# Patient Record
Sex: MALE | Race: WHITE | Employment: FULL TIME | URBAN - NONMETROPOLITAN AREA
[De-identification: names, ages, dates, MRNs, and addresses within clinical notes are randomized per-mention and may not be internally consistent; named-entity substitution may affect disease eponyms.]

---

## 2021-02-25 ENCOUNTER — OFFICE VISIT (OUTPATIENT)
Dept: FAMILY MEDICINE CLINIC | Age: 21
End: 2021-02-25

## 2021-02-25 VITALS
TEMPERATURE: 99.3 F | HEART RATE: 74 BPM | OXYGEN SATURATION: 98 % | SYSTOLIC BLOOD PRESSURE: 134 MMHG | WEIGHT: 178.6 LBS | HEIGHT: 71 IN | DIASTOLIC BLOOD PRESSURE: 82 MMHG | BODY MASS INDEX: 25 KG/M2 | RESPIRATION RATE: 16 BRPM

## 2021-02-25 DIAGNOSIS — R41.3 MEMORY CHANGE: ICD-10-CM

## 2021-02-25 DIAGNOSIS — R07.89 CHEST TIGHTNESS: ICD-10-CM

## 2021-02-25 DIAGNOSIS — R07.9 CHEST PAIN, UNSPECIFIED TYPE: Primary | ICD-10-CM

## 2021-02-25 DIAGNOSIS — R11.10 VOMITING AND DIARRHEA: ICD-10-CM

## 2021-02-25 DIAGNOSIS — R19.7 VOMITING AND DIARRHEA: ICD-10-CM

## 2021-02-25 DIAGNOSIS — R05.9 COUGH: ICD-10-CM

## 2021-02-25 PROCEDURE — 99205 OFFICE O/P NEW HI 60 MIN: CPT | Performed by: FAMILY MEDICINE

## 2021-02-25 PROCEDURE — 36415 COLL VENOUS BLD VENIPUNCTURE: CPT | Performed by: FAMILY MEDICINE

## 2021-02-25 PROCEDURE — 93000 ELECTROCARDIOGRAM COMPLETE: CPT | Performed by: FAMILY MEDICINE

## 2021-02-25 SDOH — ECONOMIC STABILITY: INCOME INSECURITY: HOW HARD IS IT FOR YOU TO PAY FOR THE VERY BASICS LIKE FOOD, HOUSING, MEDICAL CARE, AND HEATING?: NOT HARD AT ALL

## 2021-02-25 ASSESSMENT — ENCOUNTER SYMPTOMS
COUGH: 1
VOMITING: 1
DIARRHEA: 1
BLOOD IN STOOL: 0
TROUBLE SWALLOWING: 0
EYE PAIN: 0
CONSTIPATION: 0
SHORTNESS OF BREATH: 0
ABDOMINAL PAIN: 0
NAUSEA: 0

## 2021-02-25 NOTE — PROGRESS NOTES
[address] [address]  Dept: [phone]  Dept Fax: [phone]  Loc: [facility] Primer is a 23 y.o. male who presents todayfor Chest Pain (chest tightness ongoing since 2:30 yesterday, ) and Diarrhea      :   HPI  Chest pain and tightness started yesterday. Was moving a part that was around 5 lbs when he first noticed the pain. States that the pain is located on the left side of his chest and radiates to his back. Had a heat stress injury, that felt similar to this in the past. 5/10 today. Has not been worsening or getting any better. Had baby aspirin last night after calling ambulance. Had some tylenol at home. Neither helped to much with his pain. Hurts more with big deep breath. Feels like he has a 50lb weight on his chest. Used to smoke cigarettes, currently vaping nicotine. Denies alcohol use. Blood pressure was 157/80 last night while in the ambulance. Also complaining of shortness of breath last night. Had numbness of his hands, face, and jaw. Diarrhea and vomiting starting on Monday. Brother sick as well. 15years old and has fifths diease. patient has No Known Allergies. Past MedicalHistory  Trace  has no past medical history on file. Past Surgical History  The patient  has no past surgical history on file. Family History  This patient's family history is not on file. Social History  Trace  reports that he quit smoking about 18 months ago. He has a 6.00 pack-year smoking history. He quit smokeless tobacco use about 2 years ago. Medications  No current outpatient medications on file.    :     Review of Systems   Constitutional: Negative for chills, fatigue and fever. HENT: Negative for ear pain, postnasal drip and trouble swallowing. Eyes: Negative for pain and visual disturbance. Respiratory: Positive for cough. Negative for shortness of breath.     Cardiovascular: Positive for chest pain. Negative for palpitations. Gastrointestinal: Positive for diarrhea and vomiting. Negative for abdominal pain, blood in stool, constipation and nausea. Genitourinary: Negative for dysuria and urgency. Skin: Negative for rash and wound. Neurological: Negative for dizziness and headaches. Psychiatric/Behavioral: Negative for dysphoric mood. The patient is not nervous/anxious. :     Vitals:    02/25/21 1247 02/25/21 1332   BP: 134/82    Site: Left Upper Arm    Position: Sitting    Cuff Size: Large Adult    Pulse: 95 74   Resp: 16    Temp: 99.3 °F (37.4 °C)    TempSrc: Temporal    SpO2: 98%    Weight: 178 lb 9.6 oz (81 kg)    Height: 5' 11\" (1.803 m)        Physical Exam  Vitals signs and nursing note reviewed. Constitutional:       General: He is not in acute distress. Appearance: He is well-developed. He is not diaphoretic. HENT:      Head: Normocephalic and atraumatic. Right Ear: External ear normal.      Left Ear: External ear normal.      Nose: Nose normal.   Eyes:      General: No scleral icterus. Right eye: No discharge. Left eye: No discharge. Conjunctiva/sclera: Conjunctivae normal.   Neck:      Musculoskeletal: Normal range of motion. Cardiovascular:      Rate and Rhythm: Normal rate and regular rhythm. Heart sounds: Normal heart sounds. No murmur. Pulmonary:      Effort: Pulmonary effort is normal.      Breath sounds: Normal breath sounds. Skin:     General: Skin is warm and dry. Findings: No erythema or rash. Neurological:      Mental Status: He is alert and oriented to person, place, and time. Cranial Nerves: No cranial nerve deficit. Psychiatric:         Attention and Perception: Attention and perception normal.         Mood and Affect: Affect normal. Mood is anxious. Speech: Speech normal.         Behavior: Behavior normal.         Thought Content:  Thought content normal.         Cognition and Memory: Cognition normal.         Judgment: Judgment normal.         Assessment/Plan:   1. Chest pain, unspecified type  -Unclear etiology however suspect this is musculoskeletal type chest pain. Pain may be secondary to his line of work versus costochondral strain due to his cough or vomiting. -EKG reassuring   -Checks x-ray normal.  -We will also check CBC, TSH, BMP for further evaluation  -Follow-up in 2 weeks or if chest pain is not improving. -COVID-19 test as well today  - EKG 12 lead; Future  - EKG 12 lead  - XR CHEST STANDARD (2 VW); Future  - XR CHEST STANDARD (2 VW)  - CBC With Auto Differential; Future  - TSH with Reflex; Future  - Basic Metabolic Panel; Future    2. Chest tightness  Plan as above  - EKG 12 lead; Future  - EKG 12 lead  - XR CHEST STANDARD (2 VW); Future  - XR CHEST STANDARD (2 VW)  - CBC With Auto Differential; Future  - TSH with Reflex; Future  - Basic Metabolic Panel; Future    3. Cough  -Sounds more chronic however due to viral illness in the home we will swab for COVID-19 today. - COVID-19 Ambulatory; Future  - XR CHEST STANDARD (2 VW); Future  - XR CHEST STANDARD (2 VW)  - Basic Metabolic Panel; Future  - COVID-19 Ambulatory    4. Vomiting and diarrhea  -Unclear etiology however suspect viral in nature. -Brother at home with viral illness as well. -We will follow-up in 2 weeks or if symptoms do not improve. 5. Memory change  Some chronic forgetfulness; checking labs. No acute changes today. - Vitamin B12 & Folate; Future  - RPR; Future  - RPR  - Vitamin B12 & Folate        Return in about 2 weeks (around 3/11/2021) for follow up chest pain.     Orders Placed  Orders Placed This Encounter   Procedures    XR CHEST STANDARD (2 VW)     Standing Status:   Future     Number of Occurrences:   1     Standing Expiration Date:   2/25/2022     Order Specific Question:   Reason for exam:     Answer:   chest pain new onset with cough    COVID-19 Ambulatory     Standing Status: Future     Number of Occurrences:   1     Standing Expiration Date:   2/25/2022     Scheduling Instructions:      Saline media preferred given current shortage of viral transport media but both acceptable     Order Specific Question:   Is this test for diagnosis or screening? Answer:   Diagnosis of ill patient     Order Specific Question:   Symptomatic for COVID-19 as defined by CDC? Answer:   Yes     Order Specific Question:   Date of Symptom Onset     Answer:   2/22/2021     Order Specific Question:   Hospitalized for COVID-19? Answer:   No     Order Specific Question:   Admitted to ICU for COVID-19? Answer:   No     Order Specific Question:   Employed in healthcare setting? Answer:   No     Order Specific Question:   Resident in a congregate (group) care setting? Answer:   No     Order Specific Question:   Pregnant: Answer:   No     Order Specific Question:   Previously tested for COVID-19? Answer:   Unknown    CBC With Auto Differential     Standing Status:   Future     Standing Expiration Date:   2/25/2022    TSH with Reflex     Standing Status:   Future     Standing Expiration Date:   2/25/2022    Basic Metabolic Panel     Standing Status:   Future     Standing Expiration Date:   2/25/2022    EKG 12 lead     Standing Status:   Future     Number of Occurrences:   1     Standing Expiration Date:   4/26/2021     Order Specific Question:   Reason for Exam?     Answer:   Chest pain       Prescriptions given/sent   No orders of the defined types were placed in this encounter. Patient instructions given and reviewed. Discussed use, benefit, and side effects of prescribed medications. All patient questions answered. Pt voiced understanding. Attending attestation:  I personally performed and participated key or critical portions of the evaluation and management including personally performing the exam and medical decision making.   I verify the accuracy of the documentation by the resident with the following addition or changes: Chest pain is reproducible with palpation and most consistent with costochondral sprain. Normal EKG and CXR. Will also check COVID-19 given constellation of symptoms. Off work until COVID-19 test back. Exam and vitals benign with no indication of serious underlying process. Encouraged supportive care with rest, hydration, honey for cough. Tylenol and ibuprofen as needed. Will also check lab labs. Does admit to significant anxiety. Reports that he was in the [employer] and ended up admitted for SI/HI several years ago and was on Wellbutrin and two different SSRIs for a month each without symptom relief prior to ending up with discharge on disability. Also mentions an episode of hyperthermia related to [address] where core temperature reached 106 degrees. Some ongoing memory issues for several years also; unclear if prior neuroimaging. Denies drug use in the past apart from occasional marijuana - not currently using. Denies current alcohol use; quit about a year ago. Was drinking quite heavily for some time prior. Is  at present without children. Does hope to return to college and is interested in training to be a dental hygienist.   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 Does have counseling appointment upcoming tomorrow with the [place] and may pursue additional work-up for anxiety versus ADHD. No clear depressive component. Certainly anxiety could contribute to many of patient's symptoms including numbness and tingling in hands and diarrhea. Will re-check in 2 weeks and attempt to determine once labs back if any organic cause and if counseling alone effective or if medication management may be a reasonable adjunct.         Electronically signed by Chandu Min MD on 2/25/2021 at 3:17 PM        On this date 02/25/21 I have spent 65 minutes reviewing previous notes, test results and face to face with the patient discussing the diagnosis and importance of compliance with the treatment plan as well as documenting on the day of the visit.     Electronically signed by Lakshmi Gomez DO on 2/25/2021at 1:47 PM

## 2021-02-26 LAB
ANION GAP SERPL CALCULATED.3IONS-SCNC: 11 MEQ/L (ref 8–16)
BASOPHILS # BLD: 0.9 %
BASOPHILS ABSOLUTE: 0.1 THOU/MM3 (ref 0–0.1)
BUN BLDV-MCNC: 12 MG/DL (ref 7–22)
CALCIUM SERPL-MCNC: 9.8 MG/DL (ref 8.5–10.5)
CHLORIDE BLD-SCNC: 104 MEQ/L (ref 98–111)
CO2: 26 MEQ/L (ref 23–33)
CREAT SERPL-MCNC: 0.9 MG/DL (ref 0.4–1.2)
EOSINOPHIL # BLD: 1.8 %
EOSINOPHILS ABSOLUTE: 0.1 THOU/MM3 (ref 0–0.4)
ERYTHROCYTE [DISTWIDTH] IN BLOOD BY AUTOMATED COUNT: 11.8 % (ref 11.5–14.5)
ERYTHROCYTE [DISTWIDTH] IN BLOOD BY AUTOMATED COUNT: 39.2 FL (ref 35–45)
FOLATE: 16 NG/ML (ref 4.8–24.2)
GFR SERPL CREATININE-BSD FRML MDRD: > 90 ML/MIN/1.73M2
GLUCOSE BLD-MCNC: 91 MG/DL (ref 70–108)
HCT VFR BLD CALC: 50.2 % (ref 42–52)
HEMOGLOBIN: 16.8 GM/DL (ref 14–18)
IMMATURE GRANS (ABS): 0.04 THOU/MM3 (ref 0–0.07)
IMMATURE GRANULOCYTES: 0.7 %
LYMPHOCYTES # BLD: 26.1 %
LYMPHOCYTES ABSOLUTE: 1.5 THOU/MM3 (ref 1–4.8)
MCH RBC QN AUTO: 30.4 PG (ref 26–33)
MCHC RBC AUTO-ENTMCNC: 33.5 GM/DL (ref 32.2–35.5)
MCV RBC AUTO: 90.8 FL (ref 80–94)
MONOCYTES # BLD: 10.9 %
MONOCYTES ABSOLUTE: 0.6 THOU/MM3 (ref 0.4–1.3)
NUCLEATED RED BLOOD CELLS: 0 /100 WBC
PLATELET # BLD: 298 THOU/MM3 (ref 130–400)
PMV BLD AUTO: 10.1 FL (ref 9.4–12.4)
POTASSIUM SERPL-SCNC: 4.3 MEQ/L (ref 3.5–5.2)
RBC # BLD: 5.53 MILL/MM3 (ref 4.7–6.1)
RPR: NONREACTIVE
SEG NEUTROPHILS: 59.6 %
SEGMENTED NEUTROPHILS ABSOLUTE COUNT: 3.4 THOU/MM3 (ref 1.8–7.7)
SODIUM BLD-SCNC: 141 MEQ/L (ref 135–145)
TSH SERPL DL<=0.05 MIU/L-ACNC: 1.06 UIU/ML (ref 0.4–4.2)
VITAMIN B-12: 963 PG/ML (ref 211–911)
WBC # BLD: 5.7 THOU/MM3 (ref 4.8–10.8)

## 2021-02-27 LAB — SARS-COV-2: NOT DETECTED

## 2021-03-01 ENCOUNTER — TELEPHONE (OUTPATIENT)
Dept: FAMILY MEDICINE CLINIC | Age: 21
End: 2021-03-01

## 2021-12-07 ENCOUNTER — HOSPITAL ENCOUNTER (EMERGENCY)
Age: 21
Discharge: HOME | End: 2021-12-07
Payer: OTHER GOVERNMENT

## 2021-12-07 VITALS
HEART RATE: 85 BPM | OXYGEN SATURATION: 99 % | BODY MASS INDEX: 25.2 KG/M2 | WEIGHT: 180 LBS | HEIGHT: 71 IN | RESPIRATION RATE: 14 BRPM | DIASTOLIC BLOOD PRESSURE: 78 MMHG | TEMPERATURE: 98.3 F | SYSTOLIC BLOOD PRESSURE: 129 MMHG

## 2021-12-07 DIAGNOSIS — J06.9 UPPER RESPIRATORY TRACT INFECTION DUE TO COVID-19 VIRUS: Primary | ICD-10-CM

## 2021-12-07 DIAGNOSIS — U07.1 UPPER RESPIRATORY TRACT INFECTION DUE TO COVID-19 VIRUS: Primary | ICD-10-CM

## 2021-12-07 LAB — SARS-COV-2, NAA: DETECTED

## 2021-12-07 PROCEDURE — 99202 OFFICE O/P NEW SF 15 MIN: CPT | Performed by: NURSE PRACTITIONER

## 2021-12-07 PROCEDURE — 99203 OFFICE O/P NEW LOW 30 MIN: CPT

## 2021-12-07 PROCEDURE — 87635 SARS-COV-2 COVID-19 AMP PRB: CPT

## 2021-12-07 RX ORDER — ASCORBIC ACID 500 MG
500 TABLET ORAL 2 TIMES DAILY
Qty: 14 TABLET | Refills: 0 | COMMUNITY
Start: 2021-12-07 | End: 2021-12-14

## 2021-12-07 RX ORDER — IBUPROFEN 400 MG/1
400 TABLET ORAL EVERY 6 HOURS PRN
COMMUNITY
End: 2021-12-07

## 2021-12-07 RX ORDER — ZINC SULFATE 50(220)MG
50 CAPSULE ORAL DAILY
Qty: 7 CAPSULE | Refills: 0 | COMMUNITY
Start: 2021-12-07 | End: 2021-12-14

## 2021-12-07 RX ORDER — ONDANSETRON 4 MG/1
4 TABLET, ORALLY DISINTEGRATING ORAL EVERY 8 HOURS PRN
Qty: 15 TABLET | Refills: 0 | Status: SHIPPED | OUTPATIENT
Start: 2021-12-07

## 2021-12-07 RX ORDER — DEXAMETHASONE 6 MG/1
6 TABLET ORAL 2 TIMES DAILY WITH MEALS
Qty: 20 TABLET | Refills: 0 | Status: SHIPPED | OUTPATIENT
Start: 2021-12-07 | End: 2021-12-17

## 2021-12-07 RX ORDER — ACETAMINOPHEN 500 MG
500 TABLET ORAL EVERY 4 HOURS PRN
Qty: 20 TABLET | Refills: 0 | COMMUNITY
Start: 2021-12-07 | End: 2021-12-17

## 2021-12-07 ASSESSMENT — ENCOUNTER SYMPTOMS
DIARRHEA: 1
VOMITING: 0
CHEST TIGHTNESS: 0
STRIDOR: 0
APNEA: 0
RHINORRHEA: 1
ABDOMINAL PAIN: 0
SHORTNESS OF BREATH: 0
SINUS PRESSURE: 1
WHEEZING: 0
CHOKING: 0
SORE THROAT: 0
FLU SYMPTOMS: 1
NAUSEA: 1
COUGH: 1

## 2021-12-07 ASSESSMENT — PAIN DESCRIPTION - LOCATION: LOCATION: CHEST

## 2021-12-07 ASSESSMENT — PAIN DESCRIPTION - FREQUENCY: FREQUENCY: CONTINUOUS

## 2021-12-07 ASSESSMENT — PAIN DESCRIPTION - PAIN TYPE: TYPE: ACUTE PAIN

## 2021-12-07 ASSESSMENT — PAIN DESCRIPTION - DESCRIPTORS: DESCRIPTORS: PRESSURE

## 2021-12-07 ASSESSMENT — PAIN SCALES - GENERAL: PAINLEVEL_OUTOF10: 4

## 2021-12-07 NOTE — ED NOTES
Pt verbalized discharge instructions. Pt informed to go to ER if develop chest pain, shortness of breath or abdominal pain. Pt ambulatory out in stable condition. Assessment unchanged.        Shirley Lam RN  12/07/21 5154

## 2021-12-07 NOTE — ED TRIAGE NOTES
Pt ambulatory into esuc with c/o for covid. Pt states he has cough and shortness of breath with exertion for the past six days. Pt states chest heaviness with pain of 4.

## 2021-12-07 NOTE — ED PROVIDER NOTES
[facility]  Urgent Care Encounter      CHIEF COMPLAINT       Chief Complaint   Patient presents with    Concern For COVID-19     cough sob with exertion        Nurses Notes reviewed and I agree except as noted in the HPI. Diomedes Hendrix is a 19 y.o. The history is provided by the patient. No  was used. Influenza  Presenting symptoms: cough, diarrhea, fatigue, headache, myalgias, nausea and rhinorrhea    Presenting symptoms: no fever, no shortness of breath, no sore throat and no vomiting    Severity:  Moderate  Onset quality:  Sudden  Progression:  Worsening  Relieved by:  Nothing  Worsened by:  Nothing  Ineffective treatments:  None tried  Associated symptoms: decreased appetite, decreased physical activity and nasal congestion    Associated symptoms: no chills, no ear pain, no mental status change, no neck stiffness and no syncope    Risk factors: sick contacts    Risk factors: not elderly, no diabetes problem, no heart disease, no immunocompromised state, no kidney disease, no liver disease and not pregnant        REVIEW OF SYSTEMS     Review of Systems   Constitutional: Positive for activity change, appetite change, decreased appetite, diaphoresis and fatigue. Negative for chills and fever. HENT: Positive for congestion, postnasal drip, rhinorrhea and sinus pressure. Negative for ear pain and sore throat. Respiratory: Positive for cough. Negative for apnea, choking, chest tightness, shortness of breath, wheezing and stridor. Cardiovascular: Negative for chest pain, palpitations and leg swelling. Gastrointestinal: Positive for diarrhea and nausea. Negative for abdominal pain and vomiting. Musculoskeletal: Positive for myalgias. Negative for neck stiffness. Neurological: Positive for headaches. Negative for dizziness and light-headedness.        PAST MEDICAL HISTORY         Diagnosis Date    Anxiety     Depression SURGICAL HISTORY     Patient  has no past surgical history on file. CURRENT MEDICATIONS       Discharge Medication List as of 12/7/2021 12:30 PM      CONTINUE these medications which have NOT CHANGED    Details   dextromethorphan-guaiFENesin (MUCINEX DM)  MG per extended release tablet Take 1 tablet by mouth every 12 hours as neededHistorical Med             ALLERGIES     Patient is has No Known Allergies. FAMILY HISTORY     Patient's family history is not on file. SOCIAL HISTORY     Patient  reports that he quit smoking about 2 years ago. He has a 6.00 pack-year smoking history. His smokeless tobacco use includes chew. He reports previous alcohol use. He reports current drug use. Drug: Marijuana Lucianne Bars). PHYSICAL EXAM     ED TRIAGE VITALS  BP: 129/78, Temp: 98.3 °F (36.8 °C), Pulse: 85, Resp: 14, SpO2: 99 %  Physical Exam  Vitals and nursing note reviewed. Constitutional:       General: He is not in acute distress. Appearance: Normal appearance. He is normal weight. He is not ill-appearing, toxic-appearing or diaphoretic. HENT:      Head: Normocephalic and atraumatic. Right Ear: Tympanic membrane, ear canal and external ear normal. There is no impacted cerumen. Left Ear: Tympanic membrane, ear canal and external ear normal. There is no impacted cerumen. Nose: Rhinorrhea present. No congestion. Mouth/Throat:      Mouth: Mucous membranes are moist.      Pharynx: Posterior oropharyngeal erythema present. No oropharyngeal exudate. Eyes:      Extraocular Movements: Extraocular movements intact. Conjunctiva/sclera: Conjunctivae normal.   Pulmonary:      Effort: Pulmonary effort is normal. No respiratory distress. Breath sounds: Normal breath sounds. No stridor. No wheezing, rhonchi or rales. Chest:      Chest wall: No tenderness. Musculoskeletal:         General: Normal range of motion. Cervical back: Normal range of motion.    Skin:     General: Skin is warm. Neurological:      General: No focal deficit present. Mental Status: He is alert and oriented to person, place, and time. Psychiatric:         Mood and Affect: Mood normal.         Behavior: Behavior normal.         Thought Content: Thought content normal.         Judgment: Judgment normal.         DIAGNOSTIC RESULTS   Labs:  Results for orders placed or performed during the hospital encounter of 12/07/21   COVID-19, Rapid   Result Value Ref Range    SARS-CoV-2, MAYANK DETECTED (AA) NOT DETECTED       IMAGING:  No orders to display     URGENT CARE COURSE:     Vitals:    12/07/21 1157   BP: 129/78   Pulse: 85   Resp: 14   Temp: 98.3 °F (36.8 °C)   TempSrc: Temporal   SpO2: 99%   Weight: 180 lb (81.6 kg)   Height: 5' 11\" (1.803 m)       Medications - No data to display  PROCEDURES:  None  FINAL IMPRESSION      1. Upper respiratory tract infection due to COVID-19 virus        DISPOSITION/PLAN   Decision To Discharge    You are COVID-19 positive. You will be contacted by the [address] [place] [address] and/or the [facility] Department regarding length of quarantine when you may return to work and/or school. When to seek immediate emergency medical attention:    Uncontrollable fever  Trouble breathing  Persistent pain or pressure in the chest  New confusion  Inability to wake or stay awake  Pale, gray, or blue-colored skin, lips, or nail beds, depending on skin tone  *This list is not all possible symptoms. Please call your medical provider for any other symptoms that are severe or concerning to you. May take over-the-counter supplements daily if no contraindications:  Multi-vitamin/multi-mineral daily   Vitamin D3 4000 IU daily  Zinc 75 mg daily  Vitamin C 1000 mg twice daily  B-100 complex as daily as directed on bottle  Gargle with mouthwash 3 times daily, may use Scope, Crest, or Listerine.           PATIENT REFERRED TO:  [address] [place] today  [phone]    DISCHARGE MEDICATIONS:  Discharge Medication List as of 12/7/2021 12:30 PM      START taking these medications    Details   acetaminophen (TYLENOL) 500 MG tablet Take 1 tablet by mouth every 4 hours as needed for Pain or Fever, Disp-20 tablet, R-0OTC      ascorbic acid (VITAMIN C) 500 MG tablet Take 1 tablet by mouth 2 times daily for 7 days, Disp-14 tablet, R-0OTC      zinc sulfate (ZINCATE) 220 (50 Zn) MG capsule Take 1 capsule by mouth daily for 7 days, Disp-7 capsule, R-0OTC      dexamethasone (DECADRON) 6 MG tablet Take 1 tablet by mouth 2 times daily (with meals) for 10 days, Disp-20 tablet, R-0Normal      ondansetron (ZOFRAN ODT) 4 MG disintegrating tablet Take 1 tablet by mouth every 8 hours as needed for Nausea or Vomiting, Disp-15 tablet, R-0Normal           Discharge Medication List as of 12/7/2021 12:30 PM          MARY Ba CNP, APRN - CNP  12/07/21 1301

## 2021-12-07 NOTE — Clinical Note
Cesar Scales was seen and treated in our emergency department on 12/7/2021. He may return to work on 12/13/2021. Follow CDC guidelines requiring 10 days of quarantine which was reported as last Thursday. If you have any questions or concerns, please don't hesitate to call.       Carina Souza, APRN - CNP

## 2021-12-08 ENCOUNTER — CARE COORDINATION (OUTPATIENT)
Dept: CARE COORDINATION | Age: 21
End: 2021-12-08

## 2021-12-08 NOTE — CARE COORDINATION
Patient contacted regarding COVID-19 diagnosis. Discussed COVID-19 related testing which was available at this time. Test results were positive. Patient informed of results, if available? Yes. Ambulatory Care Manager contacted the patient by telephone to perform post discharge assessment. Call within 2 business days of discharge: Yes. Verified name and  with patient as identifiers. Provided introduction to self, and explanation of the CTN/ACM role, and reason for call due to risk factors for infection and/or exposure to COVID-19. Symptoms reviewed with patient who verbalized the following symptoms: fatigue, cough, shortness of breath and congestion. Due to no new or worsening symptoms encounter was not routed to provider for escalation. Discussed follow-up appointments. If no appointment was previously scheduled, appointment scheduling offered: Yes. declined  [facility] follow up appointment(s):   Future Appointments   Date Time Provider Tracie Murphy   2022  3:20 PM MARY Cardona - CNP [facility] [place] [facility] follow up appointment(s): ALEYDA    Non-face-to-face services provided:  Obtained and reviewed discharge summary and/or continuity of care documents     Advance Care Planning:   Does patient have an Advance Directive:  reviewed and current. Educated patient about risk for severe COVID-19 due to risk factors according to CDC guidelines. ACM reviewed discharge instructions, medical action plan and red flag symptoms with the patient who verbalized understanding. Discussed COVID vaccination status: Yes. Education provided on COVID-19 vaccination as appropriate. Discussed exposure protocols and quarantine with CDC Guidelines. Patient was given an opportunity to verbalize any questions and concerns and agrees to contact ACM or health care provider for questions related to their healthcare.     Reviewed and educated patient on any new and changed medications related to discharge diagnosis     Was patient discharged with a pulse oximeter? No Discussed and confirmed pulse oximeter discharge instructions and when to notify provider or seek emergency care. ACM provided contact information. Plan for follow-up call in 5-7 days based on severity of symptoms and risk factors. Advised on zone sheet, symptom management, reporting worsening symptoms, deep breathing exercises, staying active, and hydration.

## 2021-12-12 ENCOUNTER — APPOINTMENT (OUTPATIENT)
Dept: GENERAL RADIOLOGY | Age: 21
End: 2021-12-12
Payer: OTHER GOVERNMENT

## 2021-12-12 ENCOUNTER — HOSPITAL ENCOUNTER (EMERGENCY)
Age: 21
Discharge: HOME | End: 2021-12-12
Attending: EMERGENCY MEDICINE
Payer: OTHER GOVERNMENT

## 2021-12-12 VITALS
RESPIRATION RATE: 18 BRPM | OXYGEN SATURATION: 100 % | HEART RATE: 61 BPM | SYSTOLIC BLOOD PRESSURE: 128 MMHG | TEMPERATURE: 98.6 F | DIASTOLIC BLOOD PRESSURE: 81 MMHG

## 2021-12-12 DIAGNOSIS — U07.1 UPPER RESPIRATORY TRACT INFECTION DUE TO COVID-19 VIRUS: Primary | ICD-10-CM

## 2021-12-12 DIAGNOSIS — J06.9 UPPER RESPIRATORY TRACT INFECTION DUE TO COVID-19 VIRUS: Primary | ICD-10-CM

## 2021-12-12 PROCEDURE — 99282 EMERGENCY DEPT VISIT SF MDM: CPT

## 2021-12-12 PROCEDURE — 71046 X-RAY EXAM CHEST 2 VIEWS: CPT

## 2021-12-12 ASSESSMENT — ENCOUNTER SYMPTOMS
NAUSEA: 0
CHEST TIGHTNESS: 0
COUGH: 1
CONSTIPATION: 0
BACK PAIN: 0
DIARRHEA: 0
SINUS PRESSURE: 1
TROUBLE SWALLOWING: 0
SHORTNESS OF BREATH: 0
SORE THROAT: 1
ABDOMINAL PAIN: 0
VOMITING: 0
WHEEZING: 1
VOICE CHANGE: 0
RHINORRHEA: 1

## 2021-12-12 NOTE — ED PROVIDER NOTES
[facility]        Room # B/B    CHIEF COMPLAINT    Chief Complaint   Patient presents with    Positive For Covid-19    Wheezing    Cough       Nurses Notes reviewed and I agree except as noted in the HPI. HPI    Trace T Barrington Alexander is a 19 y.o. male who presents for evaluation of wheezing and coughing and fatigue since this morning. The patient started to have symptoms of wheezing fatigue low-grade fever muscle aches, diarrhea, nausea 12/2/2021 which persisted up to present. The patient went to urgent care and have a Covid test which turned out + 12/7/2021. Patient was sent home with dexamethasone which is still currently taking at this time Zofran. Patient's territory symptoms lingered and today the patient has had fatigue wheezing however she is still eating and drinking at the present. Patient denies any shortness of breath or chest pain denies any syncope no flank pain back pain. REVIEW OF SYSTEMS    Review of Systems   Constitutional: Positive for fatigue. Negative for appetite change, chills, diaphoresis and fever. HENT: Positive for congestion, rhinorrhea, sinus pressure and sore throat. Negative for ear discharge, ear pain, postnasal drip, trouble swallowing and voice change. Respiratory: Positive for cough and wheezing. Negative for chest tightness and shortness of breath. Cardiovascular: Negative for chest pain, palpitations and leg swelling. Gastrointestinal: Negative for abdominal pain, constipation, diarrhea, nausea and vomiting. Musculoskeletal: Positive for joint swelling and myalgias. Negative for arthralgias, back pain, neck pain and neck stiffness. Skin: Negative for rash. Neurological: Positive for weakness. Negative for dizziness, syncope, light-headedness, numbness and headaches. PAST MEDICAL HISTORY     has a past medical history of Anxiety and Depression.     SURGICAL HISTORY has no past surgical history on file. CURRENT MEDICATIONS    Discharge Medication List as of 12/12/2021  1:16 PM      CONTINUE these medications which have NOT CHANGED    Details   dextromethorphan-guaiFENesin (MUCINEX DM)  MG per extended release tablet Take 1 tablet by mouth every 12 hours as neededHistorical Med      acetaminophen (TYLENOL) 500 MG tablet Take 1 tablet by mouth every 4 hours as needed for Pain or Fever, Disp-20 tablet, R-0OTC      ascorbic acid (VITAMIN C) 500 MG tablet Take 1 tablet by mouth 2 times daily for 7 days, Disp-14 tablet, R-0OTC      zinc sulfate (ZINCATE) 220 (50 Zn) MG capsule Take 1 capsule by mouth daily for 7 days, Disp-7 capsule, R-0OTC      dexamethasone (DECADRON) 6 MG tablet Take 1 tablet by mouth 2 times daily (with meals) for 10 days, Disp-20 tablet, R-0Normal      ondansetron (ZOFRAN ODT) 4 MG disintegrating tablet Take 1 tablet by mouth every 8 hours as needed for Nausea or Vomiting, Disp-15 tablet, R-0Normal             ALLERGIES    has No Known Allergies. FAMILY HISTORY    has no family status information on file. family history is not on file. SOCIAL HISTORY     reports that he quit smoking about 2 years ago. He has a 6.00 pack-year smoking history. His smokeless tobacco use includes chew. He reports previous alcohol use. He reports current drug use. Drug: Marijuana Alejandro Xiao). PHYSICAL EXAM      INITIAL VITALS: /81   Pulse 61   Temp 98.6 °F (37 °C) (Oral)   Resp 18   SpO2 100% Estimated body mass index is 25.1 kg/m² as calculated from the following:    Height as of 12/7/21: 5' 11\" (1.803 m). Weight as of 12/7/21: 180 lb (81.6 kg). Physical Exam  Vitals reviewed. Constitutional:       Appearance: He is well-developed. HENT:      Head: Normocephalic and atraumatic. Right Ear: External ear normal.      Left Ear: External ear normal.      Nose: Nose normal.   Eyes:      General: No scleral icterus.      Conjunctiva/sclera: Conjunctivae normal.      Pupils: Pupils are equal, round, and reactive to light. Neck:      Thyroid: No thyromegaly. Vascular: No JVD. Cardiovascular:      Rate and Rhythm: Normal rate and regular rhythm. Heart sounds: No murmur heard. No friction rub. Pulmonary:      Effort: Pulmonary effort is normal.      Breath sounds: Decreased breath sounds present. No wheezing or rales. Chest:      Chest wall: No tenderness. Abdominal:      General: Bowel sounds are normal.      Palpations: Abdomen is soft. There is no mass. Tenderness: There is no abdominal tenderness. Musculoskeletal:      Cervical back: Normal range of motion and neck supple. Lymphadenopathy:      Cervical: No cervical adenopathy. Skin:     Findings: No rash. Neurological:      Mental Status: He is alert and oriented to person, place, and time. Psychiatric:         Behavior: Behavior is cooperative. MEDICAL DECISION MAKING    DIFFERENTIAL DIAGNOSIS:  Upper respiratory infection, fatigue, history of Covid, pneumonia      DIAGNOSTIC RESULTS      RADIOLOGY:  I have reviewed radiologic plain film image(s). The plain films will be read or overread by the radiologist.All other non-plain film images(s) such as CT, Ultrasound and MRI have been read by the radiologist.  XR CHEST (2 VW)   Final Result   Normal chest series. **This report has been created using voice recognition software. It may contain minor errors which are inherent in voice recognition technology. **      Final report electronically signed by Dr. Arnaldo Marcos MD on 12/12/2021 12:51 PM            Vitals:    Vitals:    12/12/21 1120   BP: 128/81   Pulse: 61   Resp: 18   Temp: 98.6 °F (37 °C)   TempSrc: Oral   SpO2: 100%       EMERGENCY DEPARTMENT COURSE:    Medications - No data to display    The pt was seen and evaluated by me. Within the department, I observed the pt's vitalsigns to be within acceptable range. Radiological studies were performed, results were reviewed with the patient. Within the department, the pt was given pulse oximetry apparatus. I observed the pt's condition to be hemodynamically stable during the duration of their stay. I explained my proposed course of treatment to the pt, and they were amenable to my decision. They were discharged home, and they will return to the ED if their symptoms become more severein nature, or otherwise change. Controlled Substances Monitoring:        CRITICAL CARE:   None. CONSULTS:  None    PROCEDURES:  None. FINAL IMPRESSION       1. Upper respiratory tract infection due to COVID-19 virus          DISPOSITION/PLAN  PATIENT REFERRED TO:  Family physician    Schedule an appointment as soon as possible for a visit on 12/16/2021      DISCHARGE MEDICATIONS:  Discharge Medication List as of 12/12/2021  1:16 PM            (Please note that portions of this note were completed with a voice recognition program and electronically transcribed. Efforts were [facility] edit the dictations but occasionally words are mis-transcribed . The transcription may contain errors not detected in proofreading.   This transcription was electronically signed.)     12/12/21 1:26 PM      Leonarda Loza MD      Emergency room physician           Leonarda Loza MD  12/12/21 3563

## 2021-12-12 NOTE — ED TRIAGE NOTES
Pt to er. Pt states dx COVID last week at [facility]. States has wheezing, worsening cough, abd pain and several other symptoms.

## 2021-12-12 NOTE — Clinical Note
Cesar Mcgrath was seen and treated in our emergency department on 12/12/2021. He may return to work on 12/20/2021. If you have any questions or concerns, please don't hesitate to call.       Eduin Lawrence MD

## 2021-12-13 ENCOUNTER — CARE COORDINATION (OUTPATIENT)
Dept: CARE COORDINATION | Age: 21
End: 2021-12-13

## 2021-12-21 ENCOUNTER — CARE COORDINATION (OUTPATIENT)
Dept: CARE COORDINATION | Age: 21
End: 2021-12-21

## 2022-05-03 ENCOUNTER — HOSPITAL ENCOUNTER (EMERGENCY)
Age: 22
Discharge: HOME | End: 2022-05-03
Payer: OTHER GOVERNMENT

## 2022-05-03 ENCOUNTER — APPOINTMENT (OUTPATIENT)
Dept: GENERAL RADIOLOGY | Age: 22
End: 2022-05-03
Payer: OTHER GOVERNMENT

## 2022-05-03 VITALS
TEMPERATURE: 99 F | BODY MASS INDEX: 27.3 KG/M2 | SYSTOLIC BLOOD PRESSURE: 135 MMHG | HEART RATE: 91 BPM | RESPIRATION RATE: 18 BRPM | HEIGHT: 71 IN | OXYGEN SATURATION: 100 % | WEIGHT: 195 LBS | DIASTOLIC BLOOD PRESSURE: 77 MMHG

## 2022-05-03 DIAGNOSIS — S90.851A FOREIGN BODY IN HEEL, RIGHT, INITIAL ENCOUNTER: ICD-10-CM

## 2022-05-03 DIAGNOSIS — S91.011A LACERATION OF RIGHT ANKLE, INITIAL ENCOUNTER: Primary | ICD-10-CM

## 2022-05-03 LAB
ALBUMIN SERPL-MCNC: 4.8 G/DL (ref 3.5–5.1)
ALP BLD-CCNC: 88 U/L (ref 38–126)
ALT SERPL-CCNC: 28 U/L (ref 11–66)
ANION GAP SERPL CALCULATED.3IONS-SCNC: 14 MEQ/L (ref 8–16)
AST SERPL-CCNC: 26 U/L (ref 5–40)
BASOPHILS # BLD: 0.5 %
BASOPHILS ABSOLUTE: 0 THOU/MM3 (ref 0–0.1)
BILIRUB SERPL-MCNC: 0.5 MG/DL (ref 0.3–1.2)
BILIRUBIN DIRECT: < 0.2 MG/DL (ref 0–0.3)
BUN BLDV-MCNC: 11 MG/DL (ref 7–22)
CALCIUM SERPL-MCNC: 9.5 MG/DL (ref 8.5–10.5)
CHLORIDE BLD-SCNC: 100 MEQ/L (ref 98–111)
CO2: 23 MEQ/L (ref 23–33)
CREAT SERPL-MCNC: 1 MG/DL (ref 0.4–1.2)
EOSINOPHIL # BLD: 1.8 %
EOSINOPHILS ABSOLUTE: 0.1 THOU/MM3 (ref 0–0.4)
ERYTHROCYTE [DISTWIDTH] IN BLOOD BY AUTOMATED COUNT: 11.8 % (ref 11.5–14.5)
ERYTHROCYTE [DISTWIDTH] IN BLOOD BY AUTOMATED COUNT: 38.5 FL (ref 35–45)
GFR SERPL CREATININE-BSD FRML MDRD: > 90 ML/MIN/1.73M2
GLUCOSE BLD-MCNC: 132 MG/DL (ref 70–108)
HCT VFR BLD CALC: 44.2 % (ref 42–52)
HEMOGLOBIN: 15 GM/DL (ref 14–18)
IMMATURE GRANS (ABS): 0.03 THOU/MM3 (ref 0–0.07)
IMMATURE GRANULOCYTES: 0.4 %
LYMPHOCYTES # BLD: 34.8 %
LYMPHOCYTES ABSOLUTE: 2.9 THOU/MM3 (ref 1–4.8)
MAGNESIUM: 2 MG/DL (ref 1.6–2.4)
MCH RBC QN AUTO: 30.5 PG (ref 26–33)
MCHC RBC AUTO-ENTMCNC: 33.9 GM/DL (ref 32.2–35.5)
MCV RBC AUTO: 89.8 FL (ref 80–94)
MONOCYTES # BLD: 9 %
MONOCYTES ABSOLUTE: 0.7 THOU/MM3 (ref 0.4–1.3)
NUCLEATED RED BLOOD CELLS: 0 /100 WBC
OSMOLALITY CALCULATION: 275.1 MOSMOL/KG (ref 275–300)
PLATELET # BLD: 331 THOU/MM3 (ref 130–400)
PMV BLD AUTO: 9.3 FL (ref 9.4–12.4)
POTASSIUM REFLEX MAGNESIUM: 3.3 MEQ/L (ref 3.5–5.2)
RBC # BLD: 4.92 MILL/MM3 (ref 4.7–6.1)
SEG NEUTROPHILS: 53.5 %
SEGMENTED NEUTROPHILS ABSOLUTE COUNT: 4.4 THOU/MM3 (ref 1.8–7.7)
SODIUM BLD-SCNC: 137 MEQ/L (ref 135–145)
TOTAL PROTEIN: 7.1 G/DL (ref 6.1–8)
WBC # BLD: 8.2 THOU/MM3 (ref 4.8–10.8)

## 2022-05-03 PROCEDURE — 80076 HEPATIC FUNCTION PANEL: CPT

## 2022-05-03 PROCEDURE — 99284 EMERGENCY DEPT VISIT MOD MDM: CPT

## 2022-05-03 PROCEDURE — 6370000000 HC RX 637 (ALT 250 FOR IP): Performed by: NURSE PRACTITIONER

## 2022-05-03 PROCEDURE — 96375 TX/PRO/DX INJ NEW DRUG ADDON: CPT

## 2022-05-03 PROCEDURE — 12001 RPR S/N/AX/GEN/TRNK 2.5CM/<: CPT

## 2022-05-03 PROCEDURE — 83735 ASSAY OF MAGNESIUM: CPT

## 2022-05-03 PROCEDURE — 6360000002 HC RX W HCPCS: Performed by: NURSE PRACTITIONER

## 2022-05-03 PROCEDURE — 73610 X-RAY EXAM OF ANKLE: CPT

## 2022-05-03 PROCEDURE — 2500000003 HC RX 250 WO HCPCS: Performed by: NURSE PRACTITIONER

## 2022-05-03 PROCEDURE — 96376 TX/PRO/DX INJ SAME DRUG ADON: CPT

## 2022-05-03 PROCEDURE — 80048 BASIC METABOLIC PNL TOTAL CA: CPT

## 2022-05-03 PROCEDURE — 2580000003 HC RX 258: Performed by: NURSE PRACTITIONER

## 2022-05-03 PROCEDURE — 96374 THER/PROPH/DIAG INJ IV PUSH: CPT

## 2022-05-03 PROCEDURE — 85025 COMPLETE CBC W/AUTO DIFF WBC: CPT

## 2022-05-03 RX ORDER — FENTANYL CITRATE 50 UG/ML
50 INJECTION, SOLUTION INTRAMUSCULAR; INTRAVENOUS ONCE
Status: COMPLETED | OUTPATIENT
Start: 2022-05-03 | End: 2022-05-03

## 2022-05-03 RX ORDER — DOXYCYCLINE HYCLATE 100 MG
100 TABLET ORAL 2 TIMES DAILY
Qty: 20 TABLET | Refills: 0 | Status: SHIPPED | OUTPATIENT
Start: 2022-05-03 | End: 2022-05-13

## 2022-05-03 RX ORDER — ONDANSETRON 2 MG/ML
4 INJECTION INTRAMUSCULAR; INTRAVENOUS ONCE
Status: COMPLETED | OUTPATIENT
Start: 2022-05-03 | End: 2022-05-03

## 2022-05-03 RX ORDER — 0.9 % SODIUM CHLORIDE 0.9 %
1000 INTRAVENOUS SOLUTION INTRAVENOUS ONCE
Status: COMPLETED | OUTPATIENT
Start: 2022-05-03 | End: 2022-05-03

## 2022-05-03 RX ORDER — LIDOCAINE HYDROCHLORIDE 10 MG/ML
20 INJECTION, SOLUTION EPIDURAL; INFILTRATION; INTRACAUDAL; PERINEURAL ONCE
Status: COMPLETED | OUTPATIENT
Start: 2022-05-03 | End: 2022-05-03

## 2022-05-03 RX ORDER — POTASSIUM CHLORIDE 20 MEQ/1
40 TABLET, EXTENDED RELEASE ORAL ONCE
Status: COMPLETED | OUTPATIENT
Start: 2022-05-03 | End: 2022-05-03

## 2022-05-03 RX ORDER — IBUPROFEN 800 MG/1
800 TABLET ORAL 4 TIMES DAILY PRN
Qty: 360 TABLET | Refills: 1 | Status: SHIPPED | OUTPATIENT
Start: 2022-05-03

## 2022-05-03 RX ORDER — IBUPROFEN 800 MG/1
800 TABLET ORAL ONCE
Status: COMPLETED | OUTPATIENT
Start: 2022-05-03 | End: 2022-05-03

## 2022-05-03 RX ORDER — DOXYCYCLINE HYCLATE 100 MG
100 TABLET ORAL ONCE
Status: COMPLETED | OUTPATIENT
Start: 2022-05-03 | End: 2022-05-03

## 2022-05-03 RX ORDER — FENTANYL CITRATE 50 UG/ML
100 INJECTION, SOLUTION INTRAMUSCULAR; INTRAVENOUS ONCE
Status: COMPLETED | OUTPATIENT
Start: 2022-05-03 | End: 2022-05-03

## 2022-05-03 RX ADMIN — SODIUM CHLORIDE 1000 ML: 9 INJECTION, SOLUTION INTRAVENOUS at 19:54

## 2022-05-03 RX ADMIN — DOXYCYCLINE HYCLATE 100 MG: 100 TABLET, COATED ORAL at 22:12

## 2022-05-03 RX ADMIN — POTASSIUM CHLORIDE 40 MEQ: 1500 TABLET, EXTENDED RELEASE ORAL at 21:14

## 2022-05-03 RX ADMIN — ONDANSETRON 4 MG: 2 INJECTION INTRAMUSCULAR; INTRAVENOUS at 19:56

## 2022-05-03 RX ADMIN — LIDOCAINE HYDROCHLORIDE 5 ML: 10 INJECTION, SOLUTION EPIDURAL; INFILTRATION; INTRACAUDAL; PERINEURAL at 19:57

## 2022-05-03 RX ADMIN — FENTANYL CITRATE 50 MCG: 50 INJECTION, SOLUTION INTRAMUSCULAR; INTRAVENOUS at 19:55

## 2022-05-03 RX ADMIN — IBUPROFEN 800 MG: 800 TABLET, FILM COATED ORAL at 22:12

## 2022-05-03 RX ADMIN — FENTANYL CITRATE 100 MCG: 50 INJECTION, SOLUTION INTRAMUSCULAR; INTRAVENOUS at 20:47

## 2022-05-03 ASSESSMENT — PAIN - FUNCTIONAL ASSESSMENT
PAIN_FUNCTIONAL_ASSESSMENT: 0-10
PAIN_FUNCTIONAL_ASSESSMENT: 0-10
PAIN_FUNCTIONAL_ASSESSMENT: NONE - DENIES PAIN
PAIN_FUNCTIONAL_ASSESSMENT: 0-10

## 2022-05-03 ASSESSMENT — ENCOUNTER SYMPTOMS
DIARRHEA: 0
NAUSEA: 0
SORE THROAT: 0
COUGH: 0
BACK PAIN: 1
ABDOMINAL PAIN: 0
SHORTNESS OF BREATH: 0
CONSTIPATION: 0
VOMITING: 0

## 2022-05-03 ASSESSMENT — PAIN SCALES - GENERAL
PAINLEVEL_OUTOF10: 8
PAINLEVEL_OUTOF10: 9
PAINLEVEL_OUTOF10: 9

## 2022-05-03 ASSESSMENT — PAIN DESCRIPTION - LOCATION
LOCATION: FOOT
LOCATION: ANKLE

## 2022-05-03 ASSESSMENT — PAIN DESCRIPTION - ORIENTATION
ORIENTATION: RIGHT
ORIENTATION: RIGHT

## 2022-05-03 ASSESSMENT — PAIN SCALES - WONG BAKER: WONGBAKER_NUMERICALRESPONSE: 4

## 2022-05-03 NOTE — ED TRIAGE NOTES
Pt presents to the ED through triage with c/c laceration to the outside portion of right ankle. Pt states that he slipped at home and the mirror punctured his ankle. Bleeding uncontrolled at this time. Pressure dressing applied and provider called to bedside. Pt rates pain 9/10 at this time. Vitals stable.

## 2022-05-04 NOTE — CONSULTS
Podiatric Surgery Consult    Patient Trace Shelby Rodriguez  MEDICAL RECORD NUMBER:  [id number]  AGE: 19 y.o. GENDER: male  : 2000  EPISODE DATE:  5/3/2022    Reason for Consult:  Laceration to right foot    Requesting Physician:  Petar Wick CNP    CHIEF COMPLAINT:  <principal problem not specified>    HISTORY OF PRESENT ILLNESS:                The patient is a 19 y.o. male with significant past medical history of anxiety and depression who is being seen at bedside on behalf of Dr. Kaylee Valderrama. Patient's wife and mother at bedside. Patient states that earlier this evening he was walking out of his bathroom and a mirror fell from the door and lacerated his foot. He relates that the mirror shattered and he stepped on glass. He relates that his pain is a 8/10. He states that the last time he ate was last evening. The patient denies any allergies, medications. The patient relates that he uses chew tobacco. He relates that he uses marijuana daily. He relates that he is a . He denies any N/V/F/C, SOB or chest pain. The patient denies any other pedal complaints. Immunizations:              Tetanus:  up to date      Past Medical History:    Past Medical History:   Diagnosis Date    Anxiety     Depression         Past Surgical History:    History reviewed. No pertinent surgical history. Current Medications:    No current facility-administered medications for this encounter. Allergies:  Patient has no known allergies.     Social History:    Social History     Socioeconomic History    Marital status:      Spouse name: None    Number of children: None    Years of education: None    Highest education level: None   Occupational History    None   Tobacco Use    Smoking status: Former Smoker     Packs/day: 1.00     Years: 6.00     Pack years: 6.00     Quit date: 8/15/2019     Years since quittin.7    Smokeless tobacco: Current User     Types: Chew     Last attempt to quit: 2019   Vaping Use  Vaping Use: Every day    Devices: Pre-filled or refillable cartridge   Substance and Sexual Activity    Alcohol use: Not Currently    Drug use: Yes     Types: Marijuana (Weed)     Comment: last used weekends    Sexual activity: None   Other Topics Concern    None   Social History Narrative    None     Social Determinants of Health     Financial Resource Strain:     Difficulty of Paying Living Expenses: Not on file   Food Insecurity:     Worried About Running Out of Food in the Last Year: Not on file    [place]vin of Food in the Last Year: Not on file   Transportation Needs:     Lack of Transportation (Medical): Not on file    Lack of Transportation (Non-Medical): Not on file   Physical Activity:     Days of Exercise per Week: Not on file    Minutes of Exercise per Session: Not on file   Stress:     Feeling of Stress : Not on file   Social Connections:     Frequency of Communication with Friends and Family: Not on file    Frequency of Social Gatherings with Friends and Family: Not on file    Attends Scientologist Services: Not on file    Active Member of [address] [place] [address] or Organizations: Not on file    Attends Club or Organization Meetings: Not on file    Marital Status: Not on file   Intimate Partner Violence:     Fear of Current or Ex-Partner: Not on file    Emotionally Abused: Not on file    Physically Abused: Not on file    Sexually Abused: Not on file   Housing Stability:     Unable to Pay for Housing in the Last Year: Not on file    Number of [place] in the Last Year: Not on file    Unstable Housing in the Last Year: Not on file       Family History:   family history is not on file. REVIEW OF SYSTEMS:    General appearance:  No apparent distress, appears stated age and cooperative.   Psychiatric:  Alert and oriented, thought content appropriate, normal insight    PHYSICAL EXAM:      Vitals:    /68   Pulse 101   Temp 99 °F (37.2 °C) (Oral)   Resp 18   Ht 5' 11\" (1.803 m)   Wt 195 lb (88.5 kg)   SpO2 100%   BMI 27.20 kg/m²     Exam:       Vascular: Dorsalis pedis and posterior tibial pulses are palpable bilaterally. Skin temperature is warm to cool from proximal tibial tuberosity to distal digits. CFT wbl to exposed digits. Edema absent. Hair growth wnl. Quality of skin wnl    Dermatologic: Nails 1-5 bilaterally are not thickened, elongated and dystrophic, with presence of subungual debris. Webspaces 1-4 bilaterally are clean, dry and intact. There is a curvilinear laceration noted to the lateral aspect of the right foot to the subcutaneous level. There is no tendon or bone exposed. There is sanguinous drainage (well controlled). The laceration extends proximally in a straight fashion that is very superficial. There is a small stab puncture wound to the lateral plantar heel of the right foot. Neurovascular: Light touch sensation grossly intact bilaterally. Musculoskeletal: Muscle strength 5/5 for all plantarflexors, dorsiflexors, inverters and everters examined. Pain with palpation of right foot laceration sites laterally and plantar. IMAGING    XR ANKLE RIGHT (MIN 3 VIEWS)    Result Date: 5/3/2022  3 view right ankle Comparison: None Findings: No acute fractures. Ankle mortise intact. No significant arthritic change or erosions. No ankle effusion. [place] of small radiopaque foreign bodies at the lateral plantar soft tissues of the heel. 1. No acute fracture or dislocation. 2. Cluster of small radiopaque foreign bodies at the lateral plantar soft tissues of the heel.  This document has been electronically signed by: Lea Miramontes MD on 05/03/2022 08:30 PM       LABS:   Recent Labs     05/03/22 1945   WBC 8.2   HGB 15.0   HCT 44.2           Recent Labs     05/03/22 1945      K 3.3*      CO2 23   BUN 11   CREATININE 1.0        Recent Labs     05/03/22 1945   PROT 7.1      No results for input(s): CKTOTAL, CKMB, CKMBINDEX, TROPONINI in the last 72 hours. Treatment:   Orders Placed This Encounter   Procedures    XR ANKLE RIGHT (MIN 3 VIEWS)     Standing Status:   Standing     Number of Occurrences:   1     Order Specific Question:   Reason for exam:     Answer:   laceration    CBC with Auto Differential     Standing Status:   Standing     Number of Occurrences:   1    Basic Metabolic Panel w/ Reflex to MG     Standing Status:   Standing     Number of Occurrences:   1    Hepatic Function Panel     Standing Status:   Standing     Number of Occurrences:   1    Anion Gap     Standing Status:   Standing     Number of Occurrences:   1    Glomerular Filtration Rate, Estimated     Standing Status:   Standing     Number of Occurrences:   1    Magnesium     Standing Status:   Standing     Number of Occurrences:   1    Osmolality     Standing Status:   Standing     Number of Occurrences:   1    Insert peripheral IV     Standing Status:   Standing     Number of Occurrences:   1           SUBJECTIVE:   19 y.o. male sustained laceration of foot 3 hours ago. Nature of injury: trauma. Tetanus vaccination status reviewed: last tetanus booster within 10 years, tetanus re-vaccination not indicated. OBJECTIVE:   Patient appears well, vitals are normal. Laceration 2 cm noted. Description: clean wound edges, no foreign bodies. Neurovascular and tendon structures are intact. ASSESSMENT:   Laceration as described. PLAN:   Anesthesia with 1% Lidocaine without Epinephrine. Wound cleansed, debrided of visible foreign material and necrotic tissue, and sutured. Betadine and dressing applied. Wound care instructions provided. Observe for any signs of infection or other problems. Return for suture removal in 10 days. Assessment and Plan  Principle  1. Laceration; at level of subcutaneous right foot  2.  Puncture wound; right foot    - Patient initially examined and evaluated  - X-rays taken and reviewed; see above  - Laceration flushed with saline  - Laceration repaired with 4-0 Prolene; see noted above  - Applied Steri-strips, Adaptic, Betadine, gauze, Kerlix, ABD, cast padding, ACE, posterior splint to RLE  - Patient given crutches  - NWB to RLE  - Patient given Doxycycline   - Patient given address for follow up clinic with Dr. Musa Paulson tomorrow in [address] [place] [address]. [facility]'s at 7-11am  - All of patient's and patient's family members questions and concerns addressed. DISPO: Patient given address for follow up clinic with Dr. Musa Paulson tomorrow in [place] at 7-11am    Thank you for the consultation allowing podiatry to assist in the medical welfare of this patient. Podiatry will continue to follow this patient throughout the duration of hospitalization.      [email or web address] [id number]  Foot and Ankle Surgical Resident  5/3/2022 9:27 PM

## 2022-05-04 NOTE — ED PROVIDER NOTES
[address] [facility] [address] [address] EMERGENCY DEPT  EMERGENCY MEDICINE     Pt Name: Berenice Huntley  MRN: [id number]  [place] 2000  Date of evaluation: 5/3/2022  PCP:    No primary care provider on file. Provider: MARY Longo CNP    CHIEF COMPLAINT       Chief Complaint   Patient presents with    Laceration     right foot           HISTORY OF Yuliana Miguel is a 19 y.o. male patient that presents to ER with a laceration on his right ankle. Shortly before arrival, he was pulling a stuck door and a vertical mirror became dislodged, fell, and cracked on his foot. He has a visible laceration on his on the lateral side of his posterior right foot, inferior to the malleolus. He is complaining of 9/10 pain at the wound site and the bottom of his heel. During exam, he became slightly dizzy, which resolved with a cold compress. He has history hypertension and tachycardia, and takes no medications. He has chronic back pain related to overuse, which he treats with occasional OTC NSAIDs. He has history of significant nose bleeds, including pulsing flow, and both his father and paternal uncle have history of cautery to control nose bleeds. He also has history of anxiety, depression, and heat stress brain injury. He occasionally uses electronic cigarettes, advised to discontinue. Most recent tetanus shot was in 2017. Triage notes and Nursing notes were reviewed by myself. Any discrepancies are addressed above. PAST MEDICAL HISTORY     Past Medical History:   Diagnosis Date    Anxiety     Depression        SURGICAL HISTORY       History reviewed. No pertinent surgical history.     CURRENT MEDICATIONS       Discharge Medication List as of 5/3/2022 10:34 PM      CONTINUE these medications which have NOT CHANGED    Details   dextromethorphan-guaiFENesin (MUCINEX DM)  MG per extended release tablet Take 1 tablet by mouth every 12 hours as neededHistorical Med      acetaminophen (TYLENOL) 500 MG tablet Take 1 tablet by mouth every 4 hours as needed for Pain or Fever, Disp-20 tablet, R-0OTC      ascorbic acid (VITAMIN C) 500 MG tablet Take 1 tablet by mouth 2 times daily for 7 days, Disp-14 tablet, R-0OTC      zinc sulfate (ZINCATE) 220 (50 Zn) MG capsule Take 1 capsule by mouth daily for 7 days, Disp-7 capsule, R-0OTC      ondansetron (ZOFRAN ODT) 4 MG disintegrating tablet Take 1 tablet by mouth every 8 hours as needed for Nausea or Vomiting, Disp-15 tablet, R-0Normal             ALLERGIES       No Known Allergies    FAMILY HISTORY       History reviewed. No pertinent family history. SOCIAL HISTORY       Social History     Socioeconomic History    Marital status:      Spouse name: None    Number of children: None    Years of education: None    Highest education level: None   Occupational History    None   Tobacco Use    Smoking status: Former Smoker     Packs/day: 1.00     Years: 6.00     Pack years: 6.00     Quit date: 8/15/2019     Years since quittin.7    Smokeless tobacco: Current User     Types: Chew     Last attempt to quit: 2019   Vaping Use    Vaping Use: Every day    Devices: Pre-filled or refillable cartridge   Substance and Sexual Activity    Alcohol use: Not Currently    Drug use: Yes     Types: Marijuana (Weed)     Comment: last used weekends    Sexual activity: None   Other Topics Concern    None   Social History Narrative    None     Social Determinants of Health     Financial Resource Strain:     Difficulty of Paying Living Expenses: Not on file   Food Insecurity:     Worried About Running Out of Food in the Last Year: Not on file    Edvin of Food in the Last Year: Not on file   Transportation Needs:     Lack of Transportation (Medical): Not on file    Lack of Transportation (Non-Medical):  Not on file   Physical Activity:     Days of Exercise per Week: Not on file    Minutes of Exercise per Session: Not on file   Stress:     Feeling of Stress : Not on file Social Connections:     Frequency of Communication with Friends and Family: Not on file    Frequency of Social Gatherings with Friends and Family: Not on file    Attends Mandaeism Services: Not on file    Active Member of Clubs or Organizations: Not on file    Attends Club or Organization Meetings: Not on file    Marital Status: Not on file   Intimate Partner Violence:     Fear of Current or Ex-Partner: Not on file    Emotionally Abused: Not on file    Physically Abused: Not on file    Sexually Abused: Not on file   Housing Stability:     Unable to Pay for Housing in the Last Year: Not on file    Number of [place] in the Last Year: Not on file    Unstable Housing in the Last Year: Not on file       REVIEW OF SYSTEMS     Review of Systems   Constitutional: Negative for fatigue and fever. HENT: Negative for sore throat. Respiratory: Negative for cough and shortness of breath. Cardiovascular: Negative for chest pain and palpitations. Gastrointestinal: Negative for abdominal pain, constipation, diarrhea, nausea and vomiting. Musculoskeletal: Positive for back pain (Chronic). Skin: Positive for pallor. Neurological: Positive for dizziness. Negative for syncope, numbness and headaches. Except as noted above the remainder of the review of systems was reviewed and is negative. SCREENINGS        [place] Coma Scale  Eye Opening: Spontaneous  Best Verbal Response: Oriented  Best Motor Response: Obeys commands  [place] Coma Scale Score: 15               PHYSICAL EXAM    (up to 7 for level 4, 8 or more for level 5)     ED Triage Vitals [02/19/22 1512]   BP Temp Temp Source Pulse Resp SpO2 Height Weight   (!) 134/95 98.2 °F (36.8 °C) Oral 124 16 100 % -- --       Physical Exam  Vitals and nursing note reviewed. Constitutional:       Appearance: He is not diaphoretic. HENT:      Head: Normocephalic and atraumatic.       Right Ear: External ear normal.      Left Ear: External ear normal.   Eyes:      Conjunctiva/sclera: Conjunctivae normal.   Pulmonary:      Effort: Pulmonary effort is normal. No respiratory distress. Abdominal:      General: There is no distension. Musculoskeletal:      Cervical back: Normal range of motion. Feet:    Skin:     General: Skin is warm and dry. Neurological:      Mental Status: He is alert. DIAGNOSTIC RESULTS     EKG:(none if blank)  All EKGs are interpreted by the Emergency Department Physician who either signs or Co-signs this chart in the absence of a cardiologist.        RADIOLOGY: (none if blank)   I directly visualized the following images and reviewed the radiologist interpretations. Interpretation per the Radiologist below, if available at the time of this note:  XR ANKLE RIGHT (MIN 3 VIEWS)   Final Result   1. No acute fracture or dislocation. 2. Cluster of small radiopaque foreign bodies at the lateral plantar soft    tissues of the heel. This document has been electronically signed by: Eliseo Ozuna MD on    05/03/2022 08:30 PM          LABS:  Labs Reviewed   CBC WITH AUTO DIFFERENTIAL - Abnormal; Notable for the following components:       Result Value    MPV 9.3 (*)     All other components within normal limits   BASIC METABOLIC PANEL W/ REFLEX TO MG FOR LOW K - Abnormal; Notable for the following components:    Potassium reflex Magnesium 3.3 (*)     Glucose 132 (*)     All other components within normal limits   HEPATIC FUNCTION PANEL   ANION GAP   GLOMERULAR FILTRATION RATE, ESTIMATED   MAGNESIUM   OSMOLALITY       All other labs were within normal range or not returned as of this dictation. Please note, any cultures that may have been sent were not resulted at the time of this patient visit.     EMERGENCY DEPARTMENT COURSE and Medical Decision Making:     Vitals:    Vitals:    05/03/22 2008 05/03/22 2047 05/03/22 2114 05/03/22 2213   BP: 136/74 139/74 133/68 135/77   Pulse: 80 104 101 91   Resp: 18 18 18 18 Temp:       TempSrc:       SpO2: 100% 100% 100% 100%   Weight:       Height:           PROCEDURES: (None if blank)  Procedures    ED Course as of 05/04/22 0213   Tue May 03, 2022   2100 Contacted podiatry about laceration and foreign body in the heel. They will come and see the patient. [NW]   2130 Reviewed case with Dr. Yair Meadows. Okay for discharge. [NW]      ED Course User Index  [NW] Lucian Swartz, APRN - CNP     [facility]  Number of Diagnoses or Management Options  Foreign body in heel, right, initial encounter: new, needed workup  Laceration of right ankle, initial encounter: new, needed workup  Diagnosis management comments: Last Tetanus shot was 2017       Amount and/or Complexity of Data Reviewed  Clinical lab tests: ordered and reviewed  Tests in the radiology section of CPT®: ordered and reviewed  Review and summarize past medical records: yes  Discuss the patient with other providers: yes  Independent visualization of images, tracings, or specimens: yes    Risk of Complications, Morbidity, and/or Mortality  Presenting problems: moderate  Diagnostic procedures: moderate  Management options: moderate      Patient that presents to ER with a laceration on his right ankle. Shortly before arrival, he was pulling a stuck door and a vertical mirror became dislodged, fell, and cracked on his foot. He has a visible laceration on his on the lateral side of his posterior right foot, inferior to the malleolus. Differential diagnosis includes but not limited to laceration, laceration with retained foreign body, vascular injury or superficial wound. Labs are reassuring. Patient was given a liter of fluid due to not having anything to eat today and feeling lightheaded. Patient was also treated for pain with fentanyl and nausea with Zofran. Consultation was made to podiatry. They came and repaired the wound. They will see the patient in follow-up tomorrow.      Patient instructed to return to ER for worsening symptoms, numbness or tingling in extremities, severe bleeding, signs of infection including puslike drainage or redness going away from the wounds. Follow-up with Dr. Christian Macdonald tomorrow morning as instructed by podiatry. Strict return precautions and follow up instructions were discussed with the patient with which the patient agrees    ED Medications administered this visit:    Medications   lidocaine PF 1 % injection 20 mL (5 mLs IntraDERmal Given 5/3/22 1957)   0.9 % sodium chloride bolus (0 mLs IntraVENous Stopped 5/3/22 2047)   fentaNYL (SUBLIMAZE) injection 50 mcg (50 mcg IntraVENous Given 5/3/22 1955)   ondansetron (ZOFRAN) injection 4 mg (4 mg IntraVENous Given 5/3/22 1956)   fentaNYL (SUBLIMAZE) injection 100 mcg (100 mcg IntraVENous Given 5/3/22 2047)   potassium chloride (KLOR-CON M) extended release tablet 40 mEq (40 mEq Oral Given 5/3/22 2114)   doxycycline hyclate (VIBRA-TABS) tablet 100 mg (100 mg Oral Given 5/3/22 2212)   ibuprofen (ADVIL;MOTRIN) tablet 800 mg (800 mg Oral Given 5/3/22 2212)         FINAL IMPRESSION      1. Laceration of right ankle, initial encounter    2.  Foreign body in heel, right, initial encounter          DISPOSITION/PLAN   DISPOSITION Decision To Discharge 05/03/2022 10:09:29 PM      PATIENT REFERRED TO:  Kyra Murillo DPM  [address] [facility]  [phone]            DISCHARGE MEDICATIONS:  Discharge Medication List as of 5/3/2022 10:34 PM      START taking these medications    Details   doxycycline hyclate (VIBRA-TABS) 100 MG tablet Take 1 tablet by mouth 2 times daily for 10 days, Disp-20 tablet, R-0Normal                    MARY Garcia - CNP (electronically signed)           MARY Garcia CNP  05/04/22 6678

## 2022-05-04 NOTE — ED NOTES
Podiatry still at bedside for laceration repair. Pt tolerating well.       Selina Yarbrough, SANAM  05/03/22 4130

## 2022-05-04 NOTE — ED NOTES
Pt reassessed at this time. Pt appears more relaxed at this time. Pt states that pain is \"still there and is pretty bad, but I am more relaxed now. \" family at bedside.  Call light in reach     Diamante Brunson RN  05/03/22 2025

## 2023-12-02 ENCOUNTER — HOSPITAL ENCOUNTER (EMERGENCY)
Age: 23
Discharge: HOME | End: 2023-12-02
Payer: OTHER GOVERNMENT

## 2023-12-02 VITALS
HEIGHT: 71 IN | OXYGEN SATURATION: 97 % | BODY MASS INDEX: 24.5 KG/M2 | WEIGHT: 175 LBS | DIASTOLIC BLOOD PRESSURE: 67 MMHG | RESPIRATION RATE: 18 BRPM | HEART RATE: 120 BPM | TEMPERATURE: 101.7 F | SYSTOLIC BLOOD PRESSURE: 120 MMHG

## 2023-12-02 DIAGNOSIS — J02.9 VIRAL PHARYNGITIS: Primary | ICD-10-CM

## 2023-12-02 DIAGNOSIS — J06.9 VIRAL URI: ICD-10-CM

## 2023-12-02 LAB
FLUAV AG SPEC QL: NEGATIVE
FLUBV AG SPEC QL: NEGATIVE
S PYO AG THROAT QL: NEGATIVE
SARS-COV-2 RDRP RESP QL NAA+PROBE: NOT  DETECTED

## 2023-12-02 PROCEDURE — 87070 CULTURE OTHR SPECIMN AEROBIC: CPT

## 2023-12-02 PROCEDURE — 87651 STREP A DNA AMP PROBE: CPT

## 2023-12-02 PROCEDURE — 87635 SARS-COV-2 COVID-19 AMP PRB: CPT

## 2023-12-02 PROCEDURE — 99213 OFFICE O/P EST LOW 20 MIN: CPT

## 2023-12-02 PROCEDURE — 87804 INFLUENZA ASSAY W/OPTIC: CPT

## 2023-12-02 PROCEDURE — 99213 OFFICE O/P EST LOW 20 MIN: CPT | Performed by: EMERGENCY MEDICINE

## 2023-12-02 ASSESSMENT — ENCOUNTER SYMPTOMS
SORE THROAT: 1
RHINORRHEA: 1
COUGH: 1
SHORTNESS OF BREATH: 0
WHEEZING: 0

## 2023-12-02 NOTE — ED PROVIDER NOTES
[address]  Urgent Care Encounter       CHIEF COMPLAINT       Chief Complaint   Patient presents with    Sore Throat    Generalized Body Aches       Nurses Notes reviewed and I agree except as noted in the HPI. Fouzia Sanchez is a 20 y.o. male who presents for cough, congestion, runny nose that has been present for 3 weeks. He states he has not felt that ill but definitely had some type of upper respiratory infection. This morning, he had a sudden onset of fever, body aches, chills, sore throat. He states he feels awful. This was a sudden change and has not been feeling like this over the past 3 weeks. HPI    REVIEW OF SYSTEMS     Review of Systems   Constitutional:  Positive for chills, fatigue and fever. HENT:  Positive for congestion, rhinorrhea and sore throat. Respiratory:  Positive for cough. Negative for shortness of breath and wheezing. Cardiovascular:  Negative for chest pain. Musculoskeletal:  Positive for arthralgias and myalgias. Neurological:  Positive for headaches. PAST MEDICAL HISTORY         Diagnosis Date    Anxiety     Depression        SURGICALHISTORY     Patient  has no past surgical history on file.     CURRENT MEDICATIONS       Discharge Medication List as of 12/2/2023 11:41 AM        CONTINUE these medications which have NOT CHANGED    Details   ibuprofen (ADVIL;MOTRIN) 800 MG tablet Take 1 tablet by mouth 4 times daily as needed for Pain, Disp-360 tablet, R-1Normal      dextromethorphan-guaiFENesin (MUCINEX DM)  MG per extended release tablet Take 1 tablet by mouth every 12 hours as neededHistorical Med      acetaminophen (TYLENOL) 500 MG tablet Take 1 tablet by mouth every 4 hours as needed for Pain or Fever, Disp-20 tablet, R-0OTC      ascorbic acid (VITAMIN C) 500 MG tablet Take 1 tablet by mouth 2 times daily for 7 days, Disp-14 tablet, R-0OTC      zinc sulfate (ZINCATE) 220 (50 Zn) MG capsule Take 1

## 2023-12-02 NOTE — DISCHARGE INSTRUCTIONS
Alternate Tylenol/ibuprofen every 3 hours to help with body aches and fever    Drink plenty of water    Recommend retesting for COVID-19 tomorrow afternoon    Return for worsening cough, shortness of breath, uncontrolled fevers, any new concerns

## 2023-12-03 LAB — BACTERIA THROAT AEROBE CULT: NORMAL

## 2023-12-04 LAB — BACTERIA THROAT AEROBE CULT: NORMAL
